# Patient Record
Sex: FEMALE | Race: WHITE
[De-identification: names, ages, dates, MRNs, and addresses within clinical notes are randomized per-mention and may not be internally consistent; named-entity substitution may affect disease eponyms.]

---

## 2019-06-03 ENCOUNTER — HOSPITAL ENCOUNTER (EMERGENCY)
Dept: HOSPITAL 47 - EC | Age: 44
Discharge: HOME | End: 2019-06-03
Payer: COMMERCIAL

## 2019-06-03 VITALS
RESPIRATION RATE: 16 BRPM | SYSTOLIC BLOOD PRESSURE: 174 MMHG | DIASTOLIC BLOOD PRESSURE: 94 MMHG | TEMPERATURE: 98.8 F | HEART RATE: 105 BPM
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
## 2019-06-03 DIAGNOSIS — S76.012A: Primary | ICD-10-CM

## 2019-06-03 DIAGNOSIS — F17.200: ICD-10-CM

## 2019-06-03 PROCEDURE — 99284 EMERGENCY DEPT VISIT MOD MDM: CPT

## 2019-06-03 PROCEDURE — 73502 X-RAY EXAM HIP UNI 2-3 VIEWS: CPT

## 2019-06-03 PROCEDURE — 72192 CT PELVIS W/O DYE: CPT

## 2019-06-03 NOTE — XR
EXAMINATION TYPE: XR Hip LT and AP Pelvis

 

DATE OF EXAM: 6/3/2019

 

COMPARISON: NONE

 

HISTORY: Pain. Possible hip dislocation on the left. Patient describes a pop.

 

TECHNIQUE: A single AP view of the pelvis is obtained. Two views of the left hip are obtained.  

 

FINDINGS:  There is no acute fracture/dislocation evident in the pelvis.  The hip and sacroiliac join
ts appear symmetric and unremarkable.  The overlying soft tissue appears unremarkable. There is a fra
cture deformity of the right inferior pubic ramus. This appears subacute as there is no extensive yanelis
vee formation.

 

Two views of left hip show no acute fracture or dislocation.  No focal lytic or sclerotic lesion seen
 in the proximal left femur. There is a curvilinear density at the medial aspect of the left femoral 
neck that could relate to atherosclerosis or heterotopic ossification. The overlying soft tissue is u
nremarkable. There is a small cam deformity of the left femoral head neck junction. 

 

IMPRESSION:

1. No evidence of left hip fracture or dislocation. Small cam deformity and moderate bilateral femora
l acetabular arthropathy.

2. Subacute appearing fracture of the right inferior pubic ramus.

## 2019-06-03 NOTE — ED
Extremity Problem HPI





- General


Chief complaint: Extremity Problem,Nontraumatic


Stated complaint: Poss Hip Dislocated


Time Seen by Provider: 06/03/19 14:38


Source: patient, RN notes reviewed


Mode of arrival: ambulatory


Limitations: physical limitation





- History of Present Illness


Initial comments: 





This a 43-year-old female presents emergency Department chief complaint left hip

pain.  Patient states that she rolled over in bed and felt a severe pop with 

sudden onset of pain.  Patient states that she can barely move her left hip 

secondary to pain.  Denies any falls no recent trauma.  Denies any prior 

surgeries or any problems her left hip in the past.  Patient has no symptoms 

that radiate into her leg denies any discoloration, anesthesias, pain in her 

lower leg.  She does have chronic back pain no she takes Tylenol Motrin for.





- Related Data


                                  Previous Rx's











 Medication  Instructions  Recorded


 


HYDROcodone/APAP 7.5-325MG [Norco 1 tab PO Q6HR PRN 3 Days #12 tab 06/03/19





7.5-325]  


 


Ibuprofen [Motrin] 600 mg PO Q8HR PRN #30 tab 06/03/19











                                    Allergies











Allergy/AdvReac Type Severity Reaction Status Date / Time


 


No Known Allergies Allergy   Verified 06/03/19 14:30














Review of Systems


ROS Statement: 


Those systems with pertinent positive or pertinent negative responses have been 

documented in the HPI.





ROS Other: All systems not noted in ROS Statement are negative.





Past Medical History


Past Medical History: No Reported History


Past Surgical History: No Surgical Hx Reported


Past Psychological History: No Psychological Hx Reported


Smoking Status: Current every day smoker


Past Alcohol Use History: Daily


Past Drug Use History: Marijuana





General Exam


Limitations: physical limitation


General appearance: alert, in no apparent distress


Respiratory exam: Present: normal lung sounds bilaterally.  Absent: respiratory 

distress, wheezes, rales, rhonchi, stridor


Cardiovascular Exam: Present: regular rate, normal rhythm, normal heart sounds. 

 Absent: systolic murmur, diastolic murmur, rubs, gallop, clicks


Extremities exam: Present: other (Left hip limited range of motion secondary 

pain there is diffuse tenderness, pedal pulses are equal bilaterally the lower 

extremities equal color equal warmth)


Skin exam: Present: warm, dry, intact, normal color.  Absent: rash





Course


                                   Vital Signs











  06/03/19





  14:30


 


Temperature 98.8 F


 


Pulse Rate 105 H


 


Respiratory 16





Rate 


 


Blood Pressure 174/94


 


O2 Sat by Pulse 100





Oximetry 














Medical Decision Making





- Medical Decision Making





43-year-old female presented for left hip pain.  X-ray was obtained that showed 

possibility of a pelvic fracture.  CT was obtained shows avulsion versus 

ligamentous versus muscular skeletal injury.  I do feel clinically that she has 

a ligamentous versus muscular tear.  She will follow-up with orthopedics she'll 

be provided pain medication anti-inflammatories.





Disposition


Clinical Impression: 


 Hip strain, Muscle tear





Disposition: HOME SELF-CARE


Condition: Stable


Instructions (If sedation given, give patient instructions):  Hip Sprain (ED)


Additional Instructions: 


Please return to the Emergency Department if symptoms worsen or any other 

concerns.


Prescriptions: 


Ibuprofen [Motrin] 600 mg PO Q8HR PRN #30 tab


 PRN Reason: Pain


HYDROcodone/APAP 7.5-325MG [Norco 7.5-325] 1 tab PO Q6HR PRN 3 Days #12 tab


 PRN Reason: Pain


Is patient prescribed a controlled substance at d/c from ED?: Yes


When asked, does pt state using other controlled substances?: No


If prescribed controlled substance>3 days was MAPS reviewed?: Prescribed <3 Days


If opioid is for acute pain is fill amount 7 days or less?: Yes


If Rx opioid, was Start Talking consent form obtained?: Yes


Referrals: 


Dany Corey DO [Primary Care Provider] - 1-2 days


Melchor Styles DO [Doctor of Osteopathic Medicine] - 1-2 days


Time of Disposition: 16:38

## 2019-06-03 NOTE — CT
EXAMINATION TYPE: CT pelvis wo con

 

DATE OF EXAM: 6/3/2019

 

COMPARISON: X-ray 6/3/2019

 

HISTORY: Left hip pain with inability to move left leg.  Patient denies injury

 

CT DLP: 185.3 mGycm

Automated exposure control for dose reduction was used.

 

FINDINGS: 

Osseous structures are intact. No acute fracture. Arthropathy of the hips with hypertrophic change of
 the acetabulum suggestive of femoral acetabular impingement.

No erosive changes.

Tiny linear density along the medial margin of the left femoral neck to small to characterize.

 

Degenerative change involving the lower lumbar spine with facet arthropathy.

 

IMPRESSION: 

THERE IS A LINEAR DENSITY ALONG THE LEFT FEMORAL NECK WHICH IS NONSPECIFIC AND TOO SMALL TO CHARACTER
IZE. TINY AVULSION INJURY IN THE DIFFERENTIAL DIAGNOSIS. ADDITIONALLY THERE IS AN AREA OF LOW ATTENUA
TION ALONG THE MUSCULATURE IN THE REGION OF THIS DENSITY. THIS COULD REPRESENT A MUSCULAR OR TENDINOU
S INJURY OR SMALL AMOUNT OF FLUID. MRI RECOMMENDED FOR FURTHER EVALUATION.

## 2024-10-21 ENCOUNTER — HOSPITAL ENCOUNTER (INPATIENT)
Dept: HOSPITAL 47 - EC | Age: 49
LOS: 4 days | Discharge: HOME | DRG: 754 | End: 2024-10-25
Attending: PSYCHIATRY & NEUROLOGY | Admitting: PSYCHIATRY & NEUROLOGY
Payer: MEDICAID

## 2024-10-21 VITALS — BODY MASS INDEX: 15.7 KG/M2

## 2024-10-21 DIAGNOSIS — S52.501A: ICD-10-CM

## 2024-10-21 DIAGNOSIS — F32.9: Primary | ICD-10-CM
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
## 2024-10-21 DIAGNOSIS — F41.1: ICD-10-CM

## 2024-10-21 DIAGNOSIS — E87.1: ICD-10-CM

## 2024-10-21 DIAGNOSIS — F17.210: ICD-10-CM

## 2024-10-21 DIAGNOSIS — F41.0: ICD-10-CM

## 2024-10-21 DIAGNOSIS — R45.851: ICD-10-CM

## 2024-10-21 DIAGNOSIS — Z55.5: ICD-10-CM

## 2024-10-21 DIAGNOSIS — F10.20: ICD-10-CM

## 2024-10-21 DIAGNOSIS — R13.10: ICD-10-CM

## 2024-10-21 DIAGNOSIS — Z11.52: ICD-10-CM

## 2024-10-21 DIAGNOSIS — Z79.899: ICD-10-CM

## 2024-10-21 DIAGNOSIS — G89.29: ICD-10-CM

## 2024-10-21 PROCEDURE — 80061 LIPID PANEL: CPT

## 2024-10-21 PROCEDURE — 99285 EMERGENCY DEPT VISIT HI MDM: CPT

## 2024-10-21 PROCEDURE — 85025 COMPLETE CBC W/AUTO DIFF WBC: CPT

## 2024-10-21 PROCEDURE — 83036 HEMOGLOBIN GLYCOSYLATED A1C: CPT

## 2024-10-21 PROCEDURE — 80053 COMPREHEN METABOLIC PANEL: CPT

## 2024-10-21 PROCEDURE — 87636 SARSCOV2 & INF A&B AMP PRB: CPT

## 2024-10-21 PROCEDURE — 82075 ASSAY OF BREATH ETHANOL: CPT

## 2024-10-21 PROCEDURE — 81003 URINALYSIS AUTO W/O SCOPE: CPT

## 2024-10-21 PROCEDURE — 80306 DRUG TEST PRSMV INSTRMNT: CPT

## 2024-10-21 PROCEDURE — 84443 ASSAY THYROID STIM HORMONE: CPT

## 2024-10-21 PROCEDURE — 80048 BASIC METABOLIC PNL TOTAL CA: CPT

## 2024-10-21 PROCEDURE — 84439 ASSAY OF FREE THYROXINE: CPT

## 2024-10-21 SDOH — EDUCATIONAL SECURITY - EDUCATION ATTAINMENT: LESS THAN A HIGH SCHOOL DIPLOMA: Z55.5

## 2024-10-21 NOTE — ED
General Adult HPI





- General


Chief complaint: Psychiatric Symptoms


Stated complaint: Fall-R wrist injury


Time Seen by Provider: 10/21/24 11:20


Source: patient, RN notes reviewed


Mode of arrival: wheelchair


Limitations: no limitations





- History of Present Illness


Initial comments: 


This is a 48-year-old female resents emergency department with multiple comp

laints.  States that she has been having pain to her right wrist over the past 

few weeks after she fell and has had residual pain since.  She has full range of

motion of the right wrist however this exacerbates pain.  She denies 

paresthesias.  Denies previous surgeries right wrist.  Additionally, states that

she has been having difficulty with swallowing over the past 5 to 6 months.  

States that she feels food gets stuck in her throat and will have episodes of 

emesis.  She is also had a amount of weight loss over this time period as well 

due to not eating.  Patient has a history of alcohol abuse and currently drinks 

approximately 12 cans of beer per day.  She also smokes about a pack of ciga

rettes per day over the past 30+ years.  Patient has lost in follow-up with 

primary and specialists over the past 2 years.








- Related Data


                                  Previous Rx's











 Medication  Instructions  Recorded


 


Folic Acid 1 mg PO DAILY 30 Days #30 tab 10/25/24


 


Melatonin 10 mg PO HS 30 Days #60 tab 10/25/24


 


Mirtazapine [Remeron] 7.5 mg PO HS 30 Days #15 tab 10/25/24


 


Multivitamins, Thera [Multivitamin 1 each PO DAILY 30 Days #30 tab 10/25/24





(formulary)]  


 


Nicotine 14Mg/24Hr Patch [Habitrol] 1 patch TRANSDERM DAILY  patch 10/25/24


 


Pantoprazole [Protonix] 40 mg PO AC-BRKFST 30 Days #30 tab 10/25/24


 


Thiamine [Vitamin B-1] 100 mg PO DAILY 30 Days #30 tab 10/25/24


 


traZODone HCL [Desyrel] 50 mg PO HS 30 Days #30 tab 10/25/24











                                    Allergies











Allergy/AdvReac Type Severity Reaction Status Date / Time


 


No Known Allergies Allergy   Verified 10/21/24 22:18














Review of Systems


ROS Statement: 


Those systems with pertinent positive or pertinent negative responses have been 

documented in the HPI.





ROS Other: All systems not noted in ROS Statement are negative.





Past Medical History


Past Medical History: No Reported History


Past Surgical History: No Surgical Hx Reported


Past Psychological History: No Psychological Hx Reported


Smoking Status: Current every day smoker


Past Alcohol Use History: Daily


Past Drug Use History: Marijuana





- Past Family History


  ** Mother


Family Medical History: Hypertension





General Exam





- General Exam Comments


Initial Comments: 


Visual Physical Exam


 


Vital signs reviewed


 


General: Well-appearing, nontoxic, no acute distress.


Head: Normocephalic, atraumatic


Eyes: PERRLA, EOMI


ENT: Airway patent


Chest: Nonlabored breathing


Skin: No visual rash, normal skin tone


Neuro: Alert and oriented 3


Musculoskeletal: No gross abnormalities








Limitations: no limitations


General appearance: alert, in no apparent distress, cachectic


Eye exam: Present: normal appearance, PERRL, EOMI.  Absent: scleral icterus, 

conjunctival injection, periorbital swelling


ENT exam: Present: normal exam, mucous membranes moist


Neck exam: Present: normal inspection.  Absent: tenderness, meningismus, 

lymphadenopathy


Respiratory exam: Present: normal lung sounds bilaterally, wheezes (bilateral). 

 Absent: respiratory distress, rales, rhonchi, stridor


Cardiovascular Exam: Present: regular rate, normal rhythm, normal heart sounds. 

 Absent: systolic murmur, diastolic murmur, rubs, gallop, clicks


GI/Abdominal exam: Present: soft, normal bowel sounds.  Absent: distended, 

tenderness, guarding, rebound, rigid


  ** Right


Hand Wrist exam: Present: normal inspection, full ROM (w/ pain), tenderness.  

Absent: swelling, abrasion, ecchymosis, deformity


Vascular: Present: normal capillary refill, radial pulse (2+).  Absent: vascular

 compromise


Back exam: Present: normal inspection


Psychiatric exam: Present: depressed, suicidal ideation





Course


                                   Vital Signs











  10/21/24





  11:17


 


Temperature 98 F


 


Pulse Rate 120 H


 


Respiratory 16





Rate 


 


Blood Pressure 130/84


 


O2 Sat by Pulse 100





Oximetry 














Procedures





- Orthopedic Splinting/Casting


  ** Injury #1


Side: right


Upper Extremity Injury Location: short arm


Upper Extremity Immobilizer: posterior splint, Ace wrap, synthetic pre-padded 

splint





Medical Decision Making





- Medical Decision Making


Was pt. sent in by a medical professional or institution (, PA, NP, urgent 

care, hospital, or nursing home...) When possible be specific


@ -No


Did you speak to anyone other than the patient for history (EMS, parent, family,

 police, friend...)? What history was obtained from this source 


@ -No


Did you review nursing and triage notes (agree or disagree)? Why? 


@ -I reviewed and agree with nursing and triage notes


Were old charts reviewed (outside hosp., previous admission, EMS record, old 

EKG, old radiological studies, urgent care reports/EKG's, nursing home records)?

 Report findings 


@ -No old charts were reviewed


Differential Diagnosis (chest pain, altered mental status, abdominal pain women,

 abdominal pain men, vaginal bleeding, weakness, fever, dyspnea, syncope, 

headache, dizziness, GI bleed, back pain, seizure, CVA, palpatations, mental 

health, musculoskeletal)? 


@ -Differential Mental Health


Depression, anxiety, bipolar, psychosis, schizophrenia, borderline personality, 

situational depression, adjustment disorder, behavioral disorder, brain tumor, 

malingering, substance abuse, encephalopathy, medication reaction, dementia, 

hypothyroidism, degenerative neurologic disorder, lupus.... This is not meant to

 be all-inclusive list


EKG interpreted by me (3pts min.).


@ -None


X-rays interpreted by me (1pt min.).


@ -X-ray of the right wrist reveals a nondisplaced minimally impacted transverse

 distal radial metaphyseal fracture extending into the distal radial ulnar joint


CT interpreted by me (1pt min.).


@ -None done


U/S interpreted by me (1pt. min.).


@ -None done


What testing was considered but not performed or refused? (CT, X-rays, U/S, 

labs)? Why?


@ -None


What meds were considered but not given or refused? Why?


@ -None


Did you discuss the management of the patient with other professionals 

(professionals i.e. , PA, NP, lab, RT, psych nurse, , , 

teacher, , )? Give summary


@ -No


Was smoking cessation discussed for >3mins.?


@ -No


Was critical care preformed (if so, how long)?


@ -No


Were there social determinants of health that impacted care today? How? 

(Homelessness, low income, unemployed, alcoholism, drug addiction, 

transportation, low edu. Level, literacy, decrease access to med. care, group home, 

rehab)?


@ -No


Was there de-escalation of care discussed even if they declined (Discuss DNR or 

withdrawal of care, Hospice)? DNR status


@ -No


What co-morbidities impacted this encounter? (DM, HTN, Smoking, COPD, CAD, 

Cancer, CVA, ARF, Chemo, Hep., AIDS, mental health diagnosis, sleep apnea, 

morbid obesity)?


@ -None


Was patient admitted / discharged? Hospital course, mention meds given and 

route, prescriptions, significant lab abnormalities, going to OR and other 

pertinent info.


@ -admitted. 48-year-old female with wrist pain and difficulty swallowing.  On 

patient's original triage it was noted that patient has been having suicidal 

ideation.  On my discussion with the patient she states that she has been in 

chronic pain over the past few years and would like for everything to end.  

States that if she were to commit suicide she would most likely go into her 

garage and die of carbon monoxide poisoning.  Additionally patient does have 

pain with range of motion of the right wrist and pain to palpation of the wrist.

  X-rays remarkable for a nondisplaced minimally impacted transverse distal 

fracture.  Patient was placed in a splint.  Discussed with patient at bedside 

recommend laboratory evaluation for difficulty swallowing and imaging however 

she has refused at this time.  Patient is adamant that she would like to leave. 

 Evaluation by psychiatric nurse was completed where was recommended that patie

nt be admitted to psychiatric facility due to suicidal ideation with plan and 

intent.  Additionally patient at bedside she is still refusing care and would 

like to leave AGAINST MEDICAL ADVICE however patient has been petitioned by 

psychiatry and will be admitted for further evaluation.


Undiagnosed new problem with uncertain prognosis?


@ -No


Drug Therapy requiring intensive monitoring for toxicity (Heparin, Nitro, 

Insulin, Cardizem)?


@ -No


Were any procedures done?


@ -ortho splinting


Diagnosis/symptom?


@ -suicidal ideation, radial fracture


Acute, or Chronic, or Acute on Chronic?


@ -acute


Uncomplicated (without systemic symptoms) or Complicated (systemic symptoms)?


@ -complicated


Side effects of treatment?


@ -No


Exacerbation, Progression, or Severe Exacerbation?


@ -No


Poses a threat to life or bodily function? How? (Chest pain, USA, MI, pneumonia,

 PE, COPD, DKA, ARF, appy, cholecystitis, CVA, Diverticulitis, Homicidal, 

Suicidal, threat to staff... and all critical care pts)


@ -yes





- Lab Data


Result diagrams: 


                                 10/22/24 08:38





                                 10/24/24 12:48


                                   Lab Results











  10/21/24 Range/Units





  19:14 


 


Influenza Type A (PCR)  Not Detected  (Not Detectd)  


 


Influenza Type B (PCR)  Not Detected  (Not Detectd)  


 


RSV (PCR)  Not Detected  (Not Detectd)  


 


SARS-CoV-2 (PCR)  Not Detected  (Not Detectd)  














Disposition


Clinical Impression: 


 Radial fracture, Depression, Suicidal ideation





Disposition: ADMITTED AS IP TO THIS HOSP


Condition: Serious

## 2024-10-21 NOTE — XR
EXAMINATION TYPE: XR wrist complete RT

 

DATE OF EXAM: 10/21/2024

 

COMPARISON: NONE

 

HISTORY: 48-year-old female pain after fall, history of previous injury

 

TECHNIQUE: 4 views

 

FINDINGS: There appears to be a nondisplaced but minimally impacted transverse distal radial metaphys
eal fracture. Fracture line probably extending into the distal radial ulnar joint. Associated soft ti
ssue swelling. Mild change first CMC joint.

 

IMPRESSION: Nondisplaced but minimally impacted transverse distal radial metaphyseal fracture. Fractu
re line probably extends into the distal radioulnar joint. Associated soft tissue swelling.

 

[facility], Workstation: [id number], 10/21/2024 12:03 PM

## 2024-10-22 VITALS — RESPIRATION RATE: 16 BRPM

## 2024-10-22 LAB
ALBUMIN SERPL-MCNC: 3.3 G/DL (ref 3.5–5)
ALP SERPL-CCNC: 198 U/L (ref 38–126)
ALT SERPL-CCNC: 40 U/L (ref 4–34)
ANION GAP SERPL CALC-SCNC: 10 MMOL/L
AST SERPL-CCNC: 87 U/L (ref 14–36)
BASOPHILS # BLD AUTO: 0.1 K/UL (ref 0–0.2)
BASOPHILS NFR BLD AUTO: 1 %
BUN SERPL-SCNC: 2 MG/DL (ref 7–17)
CALCIUM SPEC-MCNC: 9.2 MG/DL (ref 8.4–10.2)
CHLORIDE SERPL-SCNC: 98 MMOL/L (ref 98–107)
CHOLEST SERPL-MCNC: 184 MG/DL (ref 0–200)
CO2 SERPL-SCNC: 23 MMOL/L (ref 22–30)
EOSINOPHIL # BLD AUTO: 0.1 K/UL (ref 0–0.7)
EOSINOPHIL NFR BLD AUTO: 1 %
ERYTHROCYTE [DISTWIDTH] IN BLOOD BY AUTOMATED COUNT: 3.98 M/UL (ref 3.8–5.4)
ERYTHROCYTE [DISTWIDTH] IN BLOOD: 14.5 % (ref 11.5–15.5)
GLUCOSE SERPL-MCNC: 130 MG/DL (ref 74–99)
HCT VFR BLD AUTO: 41.8 % (ref 34–46)
HDLC SERPL-MCNC: 47.9 MG/DL (ref 40–60)
HGB BLD-MCNC: 13.5 GM/DL (ref 11.4–16)
LDLC SERPL CALC-MCNC: 114.7 MG/DL (ref 0–131)
LYMPHOCYTES # SPEC AUTO: 3.9 K/UL (ref 1–4.8)
LYMPHOCYTES NFR SPEC AUTO: 34 %
MCH RBC QN AUTO: 33.9 PG (ref 25–35)
MCHC RBC AUTO-ENTMCNC: 32.2 G/DL (ref 31–37)
MCV RBC AUTO: 105 FL (ref 80–100)
MONOCYTES # BLD AUTO: 0.9 K/UL (ref 0–1)
MONOCYTES NFR BLD AUTO: 8 %
NEUTROPHILS # BLD AUTO: 6.4 K/UL (ref 1.3–7.7)
NEUTROPHILS NFR BLD AUTO: 55 %
PLATELET # BLD AUTO: 372 K/UL (ref 150–450)
POTASSIUM SERPL-SCNC: 4 MMOL/L (ref 3.5–5.1)
PROT SERPL-MCNC: 7.1 G/DL (ref 6.3–8.2)
SODIUM SERPL-SCNC: 131 MMOL/L (ref 137–145)
TRIGL SERPL-MCNC: 107 MG/DL (ref 0–149)
VLDLC SERPL CALC-MCNC: 21.4 MG/DL (ref 5–40)
WBC # BLD AUTO: 11.7 K/UL (ref 3.8–10.6)

## 2024-10-22 RX ADMIN — MIRTAZAPINE SCH MG: 15 TABLET, FILM COATED ORAL at 20:59

## 2024-10-22 RX ADMIN — Medication SCH MG: at 08:53

## 2024-10-22 RX ADMIN — FOLIC ACID SCH MG: 1 TABLET ORAL at 08:53

## 2024-10-22 RX ADMIN — NICOTINE SCH PATCH: 14 PATCH, EXTENDED RELEASE TRANSDERMAL at 08:53

## 2024-10-22 RX ADMIN — THERA TABS SCH EACH: TAB at 08:53

## 2024-10-22 RX ADMIN — Medication SCH MG: at 21:00

## 2024-10-22 NOTE — P.CNOR
History of Present Illness





- [facility]


Consult date: 10/22/24


Requesting physician: Oniel Pina


Consult reason: other (Fractured wrist)


History of present illness: 








Patient is a 47 y/o female who presented to the emergency department yesterday 

due to right wrist pain over the past couple weeks.  Orthopedics was consulted 

due to right wrist distal radius fracture.  Patient was seen at bedside this 

morning on the behavioral health unit with splint and Ace wrap present to the 

right upper extremity.  Patient states about 2 to 3 weeks ago she tripped and 

fell onto an outstretched hand.  She says over the past few weeks she has been 

managing her pain with over-the-counter pain medication.  Patient states the 

pain has been tolerable but it has not gotten any better.  She says the right 

hand does feel weak.  Patient denies any previous orthopedic surgical history.  

Patient denies being on any blood thinners.  Patient denies chest pain, fever, 

shortness of breath, nausea, vomiting, change in vision, loss of bowel/bladder 

control.





Past Medical History


Past Medical History: No Reported History


Additional Past Medical History / Comment(s): Right radial fx whereby pt states 

she "fell about 3 weeks ago." from 10/21/2024


History of Any Multi-Drug Resistant Organisms: None Reported


Past Surgical History: No Surgical Hx Reported


Past Anesthesia/Blood Transfusion Reactions: No Reported Reaction


Past Psychological History: No Psychological Hx Reported


Smoking Status: Current every day smoker


Past Alcohol Use History: Daily, Heavy


Additional Past Alcohol Use History / Comment(s): Pt states she drinks at least 

12 beers daily x several years. Pt states "I start drinking in the morning and 

drink all day."


Past Drug Use History: Marijuana


Additional Drug Use History / Comment(s): UDS not obtained 10/21/2024





Medications and Allergies


                                Home Medications











 Medication  Instructions  Recorded  Confirmed  Type


 


No Known Home Medications  10/21/24 10/21/24 History








                                    Allergies











Allergy/AdvReac Type Severity Reaction Status Date / Time


 


No Known Allergies Allergy   Verified 10/21/24 22:18














Physical Examination


Inspection: Negative for any open fractures, significant erythema/wounds.  Ace 

bandage and splint present to the right upper extremity over the forearm. 





Sensation: Equal, symmetric, bilat intact throughout the upper extremities





Palpation: Moderate tenderness palpation over the right distal radius. NTTP 

throughout rest of exam





Range of motion: Full range of motion throughout bilateral elbows in flexion 

extension.  Patient does have good range of motion in the shoulders and forward 

elevation, abduction and external/internal rotation.  Patient does have limited 

range of motion of the right wrist in flexion's extension secondary to injury to

the wrist.  Patient is able to wiggle digits and upper extremities bilaterally.





Motor: 4/5 in all major motor groups in left upper extremity. 4/5 in resisted 

right shoulder abduction, forward elevation, internal/external rotation. 4-/5 in

resisted right elbow flexion to extension.  Right wrist motor exam not performed

due to injury





Neurovascular: radial pulse intact, 2+ bilaterally.  Cap refill under 3 seconds 

in digits of upper extremities.





Special test: Negative Homans bilaterally.








Results





- Labs


Labs: 


                  Abnormal Lab Results - Last 24 Hours (Table)











  10/22/24 10/22/24 Range/Units





  08:38 08:38 


 


WBC  11.7 H   (3.8-10.6)  k/uL


 


MCV  105.0 H   (80.0-100.0)  fL


 


Sodium   131 L  (137-145)  mmol/L


 


BUN   2 L  (7-17)  mg/dL


 


Creatinine   0.38 L  (0.52-1.04)  mg/dL


 


Glucose   130 H  (74-99)  mg/dL


 


AST   87 H  (14-36)  U/L


 


ALT   40 H  (4-34)  U/L


 


Alkaline Phosphatase   198 H  ()  U/L


 


Albumin   3.3 L  (3.5-5.0)  g/dL


 


TSH   13.400 H  (0.465-4.680)  mIU/L








                                      H & H











  10/22/24 Range/Units





  08:38 


 


Hgb  13.5  (11.4-16.0)  gm/dL


 


Hct  41.8  (34.0-46.0)  %











Result Diagrams: 


                                 10/22/24 08:38





                                 10/22/24 08:38





- Diagnostic results


Wrist/Hand x-ray: report reviewed, image reviewed (Right wrist x-ray does 

demonstrate right distal radius fracture nondisplaced, impacted)





Assessment and Plan


Assessment: 


1.  Right distal radius fracture





Plan: 


1.  Right distal radius fracture - right wrist x-ray does demonstrate right 

distal radius fracture nondisplaced, impacted.  I did review the findings of the

imaging and exam with my attending, Dr. Melton.  At this time we are not 

recommending any emergent orthopedic surgical intervention.  I did change the 

splint at bedside this morning.  Patient be nonweightbearing to the right upper 

extremity.  Pain medication as needed.  Patient to follow-up with Dr. Melton in 

the outpatient setting in 1 week.  Patient is stable from Orthopedic standpoint 

for discharge.  At this time orthopedics is signing off.  Please do not hesitate

to contact us for any further questions.





2.  Appreciate medical management





3.  Pain management -Tylenol; Motrin





4.  GI prophylaxis -MiraLAX; milk of mag





5.  DVT prophylaxis recs





6.  PT/OT -nonweightbearing right upper extremity





7.  Appreciate consult





Time with Patient: Less than 30

## 2024-10-22 NOTE — P.PN
Progress Note - Text


Progress Note Date: 10/22/24





Attempted to see the patient in the [facility] on 10/22 at 2100. The patient refused to

be seen or be evaluated.

## 2024-10-22 NOTE — P.HP
Psychiatric H&P





- .


H&P Date: 10/22/24


History & Physical: 


                                    Allergies











Allergy/AdvReac Type Severity Reaction Status Date / Time


 


No Known Allergies Allergy   Verified 10/21/24 22:18








                                   Vital Signs











Temp  98.1 F   10/22/24 06:48


 


Pulse  137 H  10/22/24 06:48


 


Resp  16   10/22/24 06:48


 


BP  123/85   10/22/24 06:48


 


Pulse Ox  97   10/22/24 06:48


 


FiO2      








                                 Intake & Output











 10/21/24 10/22/24 10/22/24





 18:59 06:59 18:59


 


Weight 37.648 kg 32.914 kg 








                             Laboratory Last Values











WBC  11.7 k/uL (3.8-10.6)  H  10/22/24  08:38    


 


RBC  3.98 m/uL (3.80-5.40)   10/22/24  08:38    


 


Hgb  13.5 gm/dL (11.4-16.0)   10/22/24  08:38    


 


Hct  41.8 % (34.0-46.0)   10/22/24  08:38    


 


MCV  105.0 fL (80.0-100.0)  H  10/22/24  08:38    


 


MCH  33.9 pg (25.0-35.0)   10/22/24  08:38    


 


MCHC  32.2 g/dL (31.0-37.0)   10/22/24  08:38    


 


RDW  14.5 % (11.5-15.5)   10/22/24  08:38    


 


Plt Count  372 k/uL (150-450)   10/22/24  08:38    


 


MPV  7.9   10/22/24  08:38    


 


Neutrophils %  55 %  10/22/24  08:38    


 


Lymphocytes %  34 %  10/22/24  08:38    


 


Monocytes %  8 %  10/22/24  08:38    


 


Eosinophils %  1 %  10/22/24  08:38    


 


Basophils %  1 %  10/22/24  08:38    


 


Neutrophils #  6.4 k/uL (1.3-7.7)   10/22/24  08:38    


 


Lymphocytes #  3.9 k/uL (1.0-4.8)   10/22/24  08:38    


 


Monocytes #  0.9 k/uL (0-1.0)   10/22/24  08:38    


 


Eosinophils #  0.1 k/uL (0-0.7)   10/22/24  08:38    


 


Basophils #  0.1 k/uL (0-0.2)   10/22/24  08:38    


 


Macrocytosis  Moderate   10/22/24  08:38    


 


Sodium  131 mmol/L (137-145)  L  10/22/24  08:38    


 


Potassium  4.0 mmol/L (3.5-5.1)   10/22/24  08:38    


 


Chloride  98 mmol/L ()   10/22/24  08:38    


 


Carbon Dioxide  23 mmol/L (22-30)   10/22/24  08:38    


 


Anion Gap  10 mmol/L  10/22/24  08:38    


 


BUN  2 mg/dL (7-17)  L  10/22/24  08:38    


 


Creatinine  0.38 mg/dL (0.52-1.04)  L  10/22/24  08:38    


 


Est GFR (CKD-EPI)AfAm  >90  (>60 ml/min/1.73 sqM)   10/22/24  08:38    


 


Est GFR (CKD-EPI)NonAf  >90  (>60 ml/min/1.73 sqM)   10/22/24  08:38    


 


Glucose  130 mg/dL (74-99)  H  10/22/24  08:38    


 


Calcium  9.2 mg/dL (8.4-10.2)   10/22/24  08:38    


 


Total Bilirubin  1.2 mg/dL (0.2-1.3)   10/22/24  08:38    


 


AST  87 U/L (14-36)  H  10/22/24  08:38    


 


ALT  40 U/L (4-34)  H  10/22/24  08:38    


 


Alkaline Phosphatase  198 U/L ()  H  10/22/24  08:38    


 


Total Protein  7.1 g/dL (6.3-8.2)   10/22/24  08:38    


 


Albumin  3.3 g/dL (3.5-5.0)  L  10/22/24  08:38    


 


TSH  13.400 mIU/L (0.465-4.680)  H  10/22/24  08:38    


 


Influenza Type A (PCR)  Not Detected  (Not Detectd)   10/21/24  19:14    


 


Influenza Type B (PCR)  Not Detected  (Not Detectd)   10/21/24  19:14    


 


RSV (PCR)  Not Detected  (Not Detectd)   10/21/24  19:14    


 


SARS-CoV-2 (PCR)  Not Detected  (Not Detectd)   10/21/24  19:14    











10/22/24 11:43


IDENTIFYING DATA: Patient is a 48-year-old  female, unemployed, currently

staying in [place]





CHIEF COMPLAINT: "Pain"





HPI: Patient presented to the hospital on 10/21 with a right wrist injury.  Per 

ED note, "states that she has been having pain to her right wrist over the past 

few weeks after she fell and has had residual pain since.  She has full range of

motion of the right wrist.  States that she has been having difficulty with 

swallowing over the past 5 to 6 months.  States that she feels food gets stuck 

in her throat will have episodes of emesis.  She has had an amount of weight 

loss over that time period as well due to not eating.  Patient has a history of 

alcohol abuse and currently drinks approximately 12 cans of beer per day she has

lost in follow-up with primary and specialist over the past 2 years."  EPS 

assessment revealed, "patient wanted to leave AMA.  Patient disclosed to triage 

that they have suicidal thoughts.  Upon further exploration of the statements, 

patient reports passive suicidal ideation.  When asked about a specific plan, 

patient states there are several ways to do it but she has only thought about 

carbon monoxide.  Patient reports physical health issues over the last 4 to 6 

months, chronic pain, not sleeping but 20 to 30 minutes a day sometimes, trouble

swallowing, significant weight loss of 26 pounds during the 4 to 6-month 

period."  Patient seen and evaluated on the unit and was agreeable to speak to 

writer in office.  She states she has been having issues with pain, specifically

her hip and wrist and states that she fell roughly 3 weeks ago.  She reports 

difficulty swallowing which resulted in a significant amount of weight loss.  

She states being unable to sleep because of her severe pain however does not 

report feeling fatigued throughout the day.  She states she has been drinking 

roughly 12-12 ounce beers per day however she does not feel like she has a 

problem drinking and has never gone to rehab and is not interested at the 

moment.  She feels like this is her only way of consuming calories as she is 

unable to swallow food.  She reports low energy as well as 2 incidents of 

suicidal ideations that appear to be more passive in nature as she denied any p

perla or intent.  At this time, patient denies any suicidal or homicidal ideations

intent or plan.  At this time patient denies any auditory or visual 

hallucinations.  Patient denies any flight of ideas racing thoughts and 

increased in goal directed behavior.  Patient admits to using tobacco.





PAST PSYCHIATRIC HISTORY: Patient has a history of alcohol use disorder.  

Patient denies being on any psychiatric medications.  Patient denies any 

previous psychiatric hospitalizations.  Patient denies any psychiatric 

outpatient follow-up.  Patient denies any history of suicide attempts in the 

past.





PMH: as per ER note





ALLERGIES: as per EMR





SUBSTANCE USE HISTORY: Patient reports smoking 1 pack/day of cigarettes and 12 

pack of beer per day over the course of several years





FAMILY PSYCHIATRIC/SUBSTANCE USE HISTORY: She reports her daughter has 

depression





SOCIAL HISTORY: Patient is  and has 2 kids that are grown.  She currently

lives in [place] and is unemployed.  Highest level of education is 11th grade.  

She denied any legal issues





MENTAL STATUS EXAM: 


General Appearance: Patient appears to be older than stated age is alert, 

directable, and attempts to cooperate. Patient appears to have poor hygiene and 

grooming.


Behavior: Patient is seated without any agitated behavior.


Speech: Patient's speech is fluent and nonpressured.


Mood/Affect: Patient reports their mood is "upset", affect is congruent and 

constricted. 


Suicidality/Homicidality:  Patient denies having any homicidal ideation intent 

or plan.  Denies any suicidal ideations intent or plan


Perceptions: Patient denies any visual hallucinations and denies any auditory 

hallucinations


Though content/process: There is no evidence of any delusional thought content 

and thought process is linear and goal-directed.


Memory and concentration: AOX3, grossly intact for the purposes of this session.

Can spell "WORLD" backwards


Judgment and insight: Poor





STRENGTHS/WEAKNESSES: strength is that patient is resilient. Weakness is that 

patient has poor judgment, heavy alcohol use and is impulsive





INTELLECT: Average





IMPRESSIONS: 


Major depressive disorder


Alcohol use disorder, severe


Generalized anxiety disorder with panic attacks


Nicotine dependence





PLAN: 


-Patient is admitted under involuntary status to [facility] for stabilization of 

psychiatric symptoms and safety. Patient has not signed adult voluntary form and

medication consent and is placed in patient's chart.  A second certification was

completed and along with petition will be filed for court.


-Medications : Start mirtazapine 7.5 mg at bedtime for mood/sleep/appetite, 

melatonin 5 mg at bedtime for sleep


-Ativan and Haldol PRN for agitation/aggression


-Started thiamine, MVM for etoh use


-CIWA protocol with Ativan PRN for ETOH withdrawal.


-Patient was counselled on substance abuse and desired to cut back on use-Will 

offer patient subtance use rehab however she declined at this moment


-Patient was informed of the risks, benefits and side effects of the medication 

and patient verbally consented to taking the medications. Patient signed med 

consent form and was placed in chart.


-Internal Medicine consult to perform medical evaluation and physical.


-NRT -nicotine patch


-SW on board for discharge planning. Encourage patient to participate in groups 

to work on coping skills.  Will await deferral and court date.

## 2024-10-23 VITALS — TEMPERATURE: 97.9 F

## 2024-10-23 LAB
ALBUMIN SERPL-MCNC: 3 G/DL (ref 3.5–5)
ALP SERPL-CCNC: 177 U/L (ref 38–126)
ALT SERPL-CCNC: 27 U/L (ref 4–34)
ANION GAP SERPL CALC-SCNC: 3 MMOL/L
AST SERPL-CCNC: 65 U/L (ref 14–36)
BUN SERPL-SCNC: 3 MG/DL (ref 7–17)
CALCIUM SPEC-MCNC: 8.9 MG/DL (ref 8.4–10.2)
CHLORIDE SERPL-SCNC: 98 MMOL/L (ref 98–107)
CO2 SERPL-SCNC: 27 MMOL/L (ref 22–30)
GLUCOSE SERPL-MCNC: 71 MG/DL (ref 74–99)
PH UR: 6 [PH] (ref 5–8)
POTASSIUM SERPL-SCNC: 3.8 MMOL/L (ref 3.5–5.1)
PROT SERPL-MCNC: 6.5 G/DL (ref 6.3–8.2)
SODIUM SERPL-SCNC: 128 MMOL/L (ref 137–145)
SP GR UR: 1 (ref 1–1.03)
T4 FREE SERPL-MCNC: 1.14 NG/DL (ref 0.78–2.19)
UROBILINOGEN UR QL STRIP: <2 MG/DL (ref ?–2)

## 2024-10-23 RX ADMIN — Medication SCH MG: at 21:06

## 2024-10-23 NOTE — P.PN
Progress Note - Text


Progress Note Date: 10/23/24





Interval History:


Patient was seen in groups and was directable and agreeable to speak with writer

in the office.  She reports poor sleep overnight despite staff reporting 7 hours

of sleep.  She denied any pain interference with her sleep but states that she 

was up the entire night.  Despite the lack of sleep, patient denied any fatigue 

or adverse effects to the medications.  She states she has never done a sleep 

study and this was recommended to her for outpatient follow-up.  Orthopedics al

so recommended patient to follow-up in 1 week given her wrist fracture.  She 

continues to report poor appetite and dietitian is recommending pured texture 

diet.  Patient denied any alcohol cravings and still does not wish to accept any

help with her alcohol use.  She reported low anxiety today.  At this time 

patient denies any suicidal or homicidal ideations, intent or plan. Patient 

denies any auditory, visual hallucinations and denies any paranoia or delusions.

Patient denies any side effects from the medications and has been compliant with

meds. 





Mental Status Exam:


General Appearance: Patient appears to be older than stated age is alert, 

directable, and cooperative.


Behavior: Patient is calmly seated without any agitated behavior.  Patient 

ambulates via walker


Speech: Patient's speech is fluent and nonpressured. 


Mood/Affect: Mood is improving mildly, affect is congruent and constricted. 


Suicidality/Homicidality:  Patient denies having any suicidal or homicidal 

ideation intent or plan.  


Perceptions: Patient denies any visual hallucinations and denies any auditory 

hallucinations


Though content/process: There is no evidence of any delusional thought content 

and thought process is linear and goal-directed.


Memory and concentration: AOX3, grossly intact for the purposes of this session


Judgment and insight: Improving mildly





Assessment


Major depressive disorder


Alcohol use disorder, severe


Generalized anxiety disorder with panic attacks


Nicotine dependence





Plan:


-Patient continues to meet criteria for inpatient psychiatric admission for 

symptom stabilization and safety. Patient has not signed adult voluntary form 

and medication consent and was placed in patient's chart.


-Medications: Continue Remeron 7.5 mg at bedtime for mood/sleep/appetite, 

increase melatonin to 10 mg at bedtime for sleep, start trazodone 50 mg at 

bedtime for sleep


-When necessary Ativan and Haldol for agitation/aggression.


-Labs: Reviewed, TSH elevated, fT4 ordered


-NRT -nicotine patch


-Orthopedics recommending patient to follow-up in 1 week


-SW on board for discharge planning.  Encouraged the patient to participate in 

milieu.  Currently awaiting deferral with  and court date.

## 2024-10-24 LAB
ANION GAP SERPL CALC-SCNC: 2 MMOL/L
BUN SERPL-SCNC: 4 MG/DL (ref 7–17)
CALCIUM SPEC-MCNC: 8.7 MG/DL (ref 8.4–10.2)
CHLORIDE SERPL-SCNC: 98 MMOL/L (ref 98–107)
CO2 SERPL-SCNC: 30 MMOL/L (ref 22–30)
GLUCOSE SERPL-MCNC: 86 MG/DL (ref 74–99)
POTASSIUM SERPL-SCNC: 3.9 MMOL/L (ref 3.5–5.1)
SODIUM SERPL-SCNC: 130 MMOL/L (ref 137–145)

## 2024-10-24 RX ADMIN — Medication STA MG: at 09:53

## 2024-10-24 NOTE — P.CONS
History of Present Illness





- Reason for Consult


Consult date: 10/24/24


Dysphagia, weight loss


Requesting physician: Evelin Gray





- Chief Complaint


Wrist pain





- History of Present Illness





This is a 48-year-old female who had presented to the emergency department 3 

days ago with multiple complaints including wrist pain, complaints of food 

getting stuck in her throat and difficulty with swallowing for the last 5 to 6 

months, as well as unintentional weight loss.  She has history of alcohol abuse 

and drinks about 12 beers a day has a history of depression and anxiety.  She 

was admitted on voluntarily to the mental health unit for further evaluation and

treatment.  Gastroenterology was consulted for dysphagia and weight loss.  Again

patient states that she has difficulty with swallowing and feels that food gets 

stuck in her throat and then it will be painful in her chest with swallowing.  

She also states that she has had about 22 pound weight loss over the last few 

weeks duration.  She denies any emesis no previous history of upper endoscopy.  

Denies history of peptic ulcer disease.  Denies regular NSAID use.





Review of Systems





REVIEW OF SYSTEMS:


CARDIOPULMONARY: No chest pain or shortness of breath.  


Gastrointestinal: No abdominal pain.   No nausea or vomiting.  No hematemesis, 

coffee-ground emesis.  No rectal bleeding, or melena.  Difficulty with 

swallowing, weight loss.


GENITOURINARY:  No dysuria or hematuria. 


MUSCULOSKELETAL: Reports normal range of motion.,  Joint pain.


SKIN: No rashes.  No jaundice.


ENDOCRINE: No chills, fevers.  No excessive weight gain or loss.  No polydipsia 

or polyuria.


PSYCHIATRIC: Unremarkable. 


NEUROLOGY: No change in mental status.  Denies dizziness, headache.


ENT: Vision unremarkable. 


CONSTITUTIONAL: Unintentional weight loss.  No fever, chills, night sweats.





Past Medical History


Past Medical History: No Reported History


Additional Past Medical History / Comment(s): Right radial fx whereby pt states 

she "fell about 3 weeks ago." from 10/21/2024


History of Any Multi-Drug Resistant Organisms: None Reported


Past Surgical History: No Surgical Hx Reported


Past Anesthesia/Blood Transfusion Reactions: No Reported Reaction


Past Psychological History: No Psychological Hx Reported


Smoking Status: Current every day smoker


Past Alcohol Use History: Daily, Heavy


Additional Past Alcohol Use History / Comment(s): Pt states she drinks at least 

12 beers daily x several years. Pt states "I start drinking in the morning and 

drink all day."


Past Drug Use History: Marijuana


Additional Drug Use History / Comment(s): UDS not obtained 10/21/2024





- Past Family History


  ** Mother


Family Medical History: Hypertension





Medications and Allergies


                                Home Medications











 Medication  Instructions  Recorded  Confirmed  Type


 


No Known Home Medications  10/21/24 10/21/24 History








                                    Allergies











Allergy/AdvReac Type Severity Reaction Status Date / Time


 


No Known Allergies Allergy   Verified 10/21/24 22:18














Physical Exam


Vitals: 


                                   Vital Signs











  Pulse BP


 


 10/24/24 06:58  107 H  97/66














General appearance: The patient is alert, oriented, appears in no acute 

distress.


HET: Head is normocephalic and atraumatic.  Conjunctiva pink.  Sclera anicteric.


Neck: Supple without lymphadenopathy.  Trachea midline.


Heart: Regular.


Lungs: Equal expansion, normal respiratory effort.


Abdomen: Soft, nontender, nondistended.


Skin:  No rashes. No jaundice.


Extremities: Normal skin color and turgor.  No pedal edema.


Neurological: No focal deficits.  Alert and oriented x3.





Results


CBC & Chem 7: 


                                 10/22/24 08:38





                                 10/24/24 12:48


Labs: 


                  Abnormal Lab Results - Last 24 Hours (Table)











  10/23/24 10/24/24 Range/Units





  15:17 12:48 


 


Sodium   130 L  (137-145)  mmol/L


 


BUN   4 L  (7-17)  mg/dL


 


Creatinine   0.42 L  (0.52-1.04)  mg/dL


 


U Benzodiazepines Scrn  Detected H   (NotDetected)  














Assessment and Plan


(1) Dysphagia


Narrative/Plan: 


48-year-old female with alcohol use disorder with complaints of dysphagia for 

the last 5 to 6 months.  Unclear etiology could be secondary to gastritis, 

peptic ulcer disease or other etiology.  Need to consider possible esophageal 

stricture.  Patient offered upper endoscopy however is declining as she states 

that she will be discharged tomorrow and would like to have it done as an 

outpatient.  This is reasonable and we will try to treat symptomatically with Pr

otonix 40 mg daily.  Diet as tolerated.


Current Visit: Yes   Status: Acute   Code(s): R13.10 - DYSPHAGIA, UNSPECIFIED   

SNOMED Code(s): 34292101


   





(2) Unintentional weight loss


Current Visit: Yes   Status: Acute   Code(s): R63.4 - ABNORMAL WEIGHT LOSS   

SNOMED Code(s): 834873562


   


Plan: 





1.  Continue symptomatic and supportive care


2.  Protonix 40 mg daily


3.  Diet as tolerated


4.  Patient offered upper endoscopy tomorrow however patient is declining and 

would like to have it done as an outpatient


Thank you for this consultation, patient is cleared from gastroenterology for 

discharge. 





Dr. ANGELLA Mooney


I agree with the dictator's note, documented as a scribe by Reema THOMPSON.

## 2024-10-24 NOTE — P.CONS
History of Present Illness





- Reason for Consult


Consult date: 10/23/24





- History of Present Illness





The patient is a 48-year-old female with no known PMH who had presented to the 

emergency room with complaints.  Patient reported that she had a fall several 

weeks ago and injured her right wrist and has been experiencing pain there with 

activities.  She also reports dysphagia with solids and occasionally with 

liquids for the past 5 to 6 months.  Reports that as a result she has been 

unable to eat as much as usual and has lost a significant amount of weight after

40 pounds in the past 6 months.  Denies a dyne aphasia but does report some 

epigastric abdominal discomfort with eating.  She did report ongoing alcohol 

abuse drinking 12 cans of beer daily.  Also reports feeling generalized weakness

due to her poor appetite and diet.  Denies experiencing chest discomfort, 

nausea, vomiting, diarrhea, fever, chills, cough.  Denies any illicit substance 

use. The patient was admitted to the mental health unit for depression and 

generalized anxiety disorder with panic attacks.





Review of systems:


Pertinent positives and negatives as discussed in HPI, a complete review of 

systems was performed and all other systems are negative.





Physical examination:


General: non toxic, no distress, appears at stated age, normal weight


Derm: no unusual rashes/lesions, no unusual ecchymoses, warm, dry


Head: atraumatic, normocephalic, symmetric


Eyes: EOMI, no lid lag, anicteric sclera


ENT: Nose and ears atraumatic, no thrush,  no pharyngeal erythema


Neck: trachea midline, supple


Mouth: no lip lesion, mucus membranes moist


Cardiovascular: S1S2 reg, no murmur, no edema


Lungs: CTA bilateral, no rhonchi, no rales , no accessory muscle use


Abdominal: soft,  nontender to palpation, no guarding


Ext: no gross muscle atrophy, no contractures, 


Neuro: No gross focal neuro deficits noted 


Psych: Alert, oriented, appropriate affect 





Assessment:





Right distal radius fracture


Dysphagia with significant weight loss with solids and liquids


Hyponatremia


Hypoglycemia


Depression with anxiety and panic attacks


Leukocytosis, no signs of active infection at this time, likely reactive





Imaging:


Wrist x-ray on the right side revealed a nondisplaced minimally impacted distal 

radial fracture.





Data Review:


Laboratory evaluation was remarkable for sodium 128, glucose 71, AST 65, alk 

phos 177, WBC count 11.7, with .





Plan:





Orthopedic surgery recommendations appreciated.  No aggressive intervention at t

his time


Consult GI in light of dysphagia and epigastric discomfort


Monitor BMP for resolution of hyponatremia and hypoglycemia


Encourage oral intake


Defer management of depression and suicidal ideation to the primary psychiatry 

service





Thank you for allowing us to participate in the care of this patient.  We will 

follow peripherally.  Do not hesitate to contact us with questions.  Someone can

be reached from the [facility] hospitalist group at all hours of the day 

at [phone].





Past Medical History


Past Medical History: No Reported History


Additional Past Medical History / Comment(s): Right radial fx whereby pt states 

she "fell about 3 weeks ago." from 10/21/2024


History of Any Multi-Drug Resistant Organisms: None Reported


Past Surgical History: No Surgical Hx Reported


Past Anesthesia/Blood Transfusion Reactions: No Reported Reaction


Past Psychological History: No Psychological Hx Reported


Smoking Status: Current every day smoker


Past Alcohol Use History: Daily, Heavy


Additional Past Alcohol Use History / Comment(s): Pt states she drinks at least 

12 beers daily x several years. Pt states "I start drinking in the morning and 

drink all day."


Past Drug Use History: Marijuana


Additional Drug Use History / Comment(s): UDS not obtained 10/21/2024





- Past Family History


  ** Mother


Family Medical History: Hypertension





Medications and Allergies


                                Home Medications











 Medication  Instructions  Recorded  Confirmed  Type


 


No Known Home Medications  10/21/24 10/21/24 History








                                    Allergies











Allergy/AdvReac Type Severity Reaction Status Date / Time


 


No Known Allergies Allergy   Verified 10/21/24 22:18














Physical Exam


Vitals: 


                                   Vital Signs











  Temp Pulse Resp BP Pulse Ox


 


 10/23/24 06:58  97.9 F  98  16  120/83  96














Results


CBC & Chem 7: 


                                 10/22/24 08:38





                                 10/23/24 12:26


Labs: 


                  Abnormal Lab Results - Last 24 Hours (Table)











  10/23/24 10/23/24 Range/Units





  12:26 15:17 


 


Sodium  128 L   (137-145)  mmol/L


 


BUN  3 L   (7-17)  mg/dL


 


Creatinine  0.37 L   (0.52-1.04)  mg/dL


 


Glucose  71 L   (74-99)  mg/dL


 


AST  65 H   (14-36)  U/L


 


Alkaline Phosphatase  177 H   ()  U/L


 


Albumin  3.0 L   (3.5-5.0)  g/dL


 


U Benzodiazepines Scrn   Detected H  (NotDetected)

## 2024-10-24 NOTE — P.PN
Progress Note - Text


Progress Note Date: 10/24/24





Interval History:


Patient was seen wandering the hallways and was directable and agreeable to sp

desiree with writer in the office.  She states feeling well today and that she was 

finally able to get some sleep last night.  Staff noted patient to be sleeping 

for 5 hours last night.  Sleep hygiene was discussed with the patient and she 

was recommended to incorporate this into her regimen.  Patient's prior alcohol 

use was discussed thoroughly with the patient including the guidelines for the 

standard recommended amount of alcohol intake per day for women.  Patient was 

very open about her desire to continue drinking upon discharge however alcohol's

long-term effects was discussed including patient's elevated liver enzymes and 

she was strongly encouraged to decrease its use however appears to be 

precontemplative today.  Patient expresses poor appetite related to difficulty 

swallowing.  Medical consulted GI related to patient's swallowing difficulties. 

Patient was able to see her  yesterday during visitation and she signed a

deferral for treatment with a  yesterday.  At this time patient denies any

suicidal or homicidal ideations, intent or plan. Patient denies any auditory, 

visual hallucinations and denies any paranoia or delusions. Patient denies any 

side effects from the medications and has been compliant with meds. 





Mental Status Exam:


General Appearance: Patient appears to be order than stated age is alert, 

directable, and cooperative.  Patient is very thin


Behavior: Patient is calmly seated without any agitated behavior.  Patient 

ambulates via walker


Speech: Patient's speech is fluent and nonpressured. 


Mood/Affect: Mood is improving mildly, affect is congruent and blunted but 

reactive. 


Suicidality/Homicidality:  Patient denies having any suicidal or homicidal 

ideation intent or plan.  


Perceptions: Patient denies any visual hallucinations and denies any auditory 

hallucinations


Though content/process: There is no evidence of any delusional thought content 

and thought process is linear and goal-directed.


Memory and concentration: AOX3, grossly intact for the purposes of this session


Judgment and insight: Improving mildly





Assessment


Major depressive disorder


Alcohol use disorder, severe


Generalized anxiety disorder with panic attacks


Nicotine dependence





Plan:


-Patient continues to meet criteria for inpatient psychiatric admission for 

symptom stabilization and safety. Patient has not signed adult voluntary form 

and medication consent and was placed in patient's chart.


-Medications: Continue Remeron 7.5 mg at bedtime for mood/sleep/appetite, 

melatonin 10 mg at bedtime for sleep, trazodone 50 mg at bedtime for sleep


-When necessary Ativan and Haldol for agitation/aggression.


-Labs: fT4 returned back within normal limits, repeat BMP ordered as patient 

sodium was low


-NRT -nicotine patch


-CIWA protocol with Ativan PRN for ETOH withdrawal.


-Orthopedics is recommending patient to follow-up in 1 week outpatient


-SW on board for discharge planning.  Encouraged the patient to participate in 

milieu.  Patient signed a deferral yesterday with a .  Anticipate 

discharge home with  tomorrow, social work confirmed no firearms in the 

home

## 2024-10-25 VITALS — DIASTOLIC BLOOD PRESSURE: 62 MMHG | SYSTOLIC BLOOD PRESSURE: 94 MMHG | HEART RATE: 135 BPM

## 2024-10-25 RX ADMIN — PANTOPRAZOLE SODIUM SCH MG: 40 TABLET, DELAYED RELEASE ORAL at 08:39

## 2024-10-25 NOTE — P.DS
Providers


Date of admission: 


10/21/24 21:18





Expected date of discharge: 10/25/24


Attending physician: 


Wendy Bansal MD





Consults: 





                                        





10/21/24 21:32


Consult Physician Routine 


   Consulting Provider: [facility]


   Consult Reason/Comments: H&P and medical


   Do you want consulting provider notified?: Yes





10/21/24 23:42


Consult Physician Routine 


   Consulting Provider: Jose Melton


   Consult Reason/Comments: Fractured wrist


   Do you want consulting provider notified?: Yes, Notify in am





10/24/24 01:30


Consult Physician Urgent 


   Consulting Provider: Linda Mooney


   Consult Reason/Comments: Dysphagia, weight loss


   Do you want consulting provider notified?: Yes











Primary care physician: 


Stated None








- Discharge Diagnosis(es)


(1) Major depressive disorder


Current Visit: Yes   Status: Acute   Priority: High   





(2) Alcohol use disorder, severe, dependence


Current Visit: Yes   Status: Acute   Priority: High   





(3) Generalized anxiety disorder with panic attacks


Current Visit: Yes   Status: Chronic   Priority: Low   





(4) Nicotine dependence


Current Visit: Yes   Status: Acute   Priority: Low   


Hospital Course: 





Admission HPI:


Admission note was completed by writer "Patient presented to the hospital on 

10/21 with a right wrist injury.  Per ED note, "states that she has been having 

pain to her right wrist over the past few weeks after she fell and has had 

residual pain since.  She has full range of motion of the right wrist.  States 

that she has been having difficulty with swallowing over the past 5 to 6 months.

 States that she feels food gets stuck in her throat will have episodes of 

emesis.  She has had an amount of weight loss over that time period as well due 

to not eating.  Patient has a history of alcohol abuse and currently drinks 

approximately 12 cans of beer per day she has lost in follow-up with primary and

specialist over the past 2 years."  EPS assessment revealed, "patient wanted to 

leave AMA.  Patient disclosed to triage that they have suicidal thoughts.  Upon 

further exploration of the statements, patient reports passive suicidal 

ideation.  When asked about a specific plan, patient states there are several 

ways to do it but she has only thought about carbon monoxide.  Patient reports 

physical health issues over the last 4 to 6 months, chronic pain, not sleeping 

but 20 to 30 minutes a day sometimes, trouble swallowing, significant weight 

loss of 26 pounds during the 4 to 6-month period."  Patient seen and evaluated 

on the unit and was agreeable to speak to writer in office.  She states she has 

been having issues with pain, specifically her hip and wrist and states that she

fell roughly 3 weeks ago.  She reports difficulty swallowing which resulted in a

significant amount of weight loss.  She states being unable to sleep because of 

her severe pain however does not report feeling fatigued throughout the day.  

She states she has been drinking roughly 12-12 ounce beers per day however she 

does not feel like she has a problem drinking and has never gone to rehab and is

not interested at the moment.  She feels like this is her only way of consuming 

calories as she is unable to swallow food.  She reports low energy as well as 2 

incidents of suicidal ideations that appear to be more passive in nature as she 

denied any plan or intent.  At this time, patient denies any suicidal or 

homicidal ideations intent or plan.  At this time patient denies any auditory or

visual hallucinations.  Patient denies any flight of ideas racing thoughts and 

increased in goal directed behavior.  Patient admits to using tobacco."





Hospital course:


Upon admission to the unit patient was admitted involuntarily on a petition and 

certificate and a second certificate was completed and faxed to the courts.  

Patient ended up signing a deferral with the  and agreeing to treatme

nt.. Patient got along well with other patients on the unit and followed unit 

protocol.  Patient was compliant with the medications and denied any side 

effects throughout hospital course.  Patient was started on Remeron 7.5 mg at 

bedtime, melatonin which was increased to 10 mg at bedtime, trazodone 50 mg at 

bedtime.  Patient spoke of her stressors and engaged in therapy both group and 

individual.  Patient was also seen by medical team for history and physical 

exam.  Patient was also seen by orthopedics given her wrist fracture and they 

recommended an outpatient follow-up in 1 week.  Patient reported dysphagia with 

significant weight loss so GI was consulted as well and they recommended 

endoscopy outpatient started patient on Protonix.  Patient to also follow-up 

with primary care outpatient given her chronic pain which is contributing to 

mood symptoms.  Throughout the course of the hospitalization patient gradually 

improved with regards to mood, anxiety, sleep and returned back to their 

baseline level of functioning became more future oriented with improved insight 

and judgment. On the day of discharge patient denied any suicidal or homicidal 

ideations intent or plan denied any auditory or visual hallucinations.  The 

patient denied any access to guns or weapons.  Patient denied any paranoia and 

did not endorse any delusions.  Patient does have a significant history of 

substance abuse and was counseled on abstaining from all substances including 

alcohol.  Patient was offered however declined inpatient substance-abuse rehab. 

Patient was strongly encouraged to limit her alcohol intake to 1 standard drink 

per day per guidelines. Patient was also counseled on the medications and need 

for regular compliance and was encouraged to follow-up with their outpatient 

appointment for mental health and also for primary care.  Prior to discharge a 

family meeting will be arranged by  to answer any questions and 

ensure safety upon discharge incuding making sure that guns/weapons are either 

removed from the home or locked away.





Mental status exam:


General Appearance: Patient appears to be older than stated age is alert, 

pleasant, and cooperative. Patient is in no acute distress and has improved 

hygiene and grooming 


Behavior: Patient is calmly seated without any agitated behavior.  Ambulates via

walker


Speech: Patient's speech is fluent and nonpressured. 


Mood/Affect: Patient reports their mood is "better", affect is congruent and 

euthymic, reactive. 


Suicidality/Homicidality:  Patient denies having any suicidal or homicidal 

ideation intent or plan.  


Perceptions: Patient denies any auditory or visual hallucinations.  


Though content/process: There is no evidence of any delusional thought content 

and thought process is linear and goal-directed.  More future oriented


Memory and concentration: AOX3, grossly intact for the purposes of this session.

Can spell "WORLD" backwards correctly.


Judgment and insight: Fair





Impression:


Major depressive disorder


Alcohol use disorder, severe


Generalized anxiety disorder with panic attacks


Nicotine dependence





Plan:


-Continue with discharge today as patient has improved and stabilized 

psychiatrically and is not currently an imminent threat to themself and/or 

others.


-Continue medications: Continue Remeron 7.5 mg at bedtime for 

mood/sleep/appetite, melatonin 10 mg at bedtime for sleep, trazodone 50 mg at 

bedtime for sleep


-Patient was counseled on the need for medication compliance and appropriate 

follow-up at mental health and also primary care for medical issues.  Patient 

verbalized understanding and agreed.


-Social work to help coordinate patients discharge today arrange for and conduct

family meeting to ensure safety upon discharge and answer any 

questions/concerns. also to ensure safe home environment that guns/weapons are 

either removed from the home or locked away. Social work also to arrange for 

patients follow up appointments with [facility] for psychiatric care along with follow 

up with primary care provider.


-Patient counseled on abstaining from recreational drugs and marijuana and 

alcohol. Was informed/educated on the adverse effects on their physical and 

mental health. Patient verbally agreed and understood.  Patient was offered 

substance abuse treatment however declined at this time.


-Patient was instructed to return to the hospital or seek immediate medical care

if their psychiatric or medical symptoms do worsen or reoccur.








                                  Abnormal Labs











  10/22/24 10/22/24 10/23/24





  08:38 08:38 12:26


 


WBC  11.7 H  


 


MCV  105.0 H  


 


Sodium   131 L  128 L


 


BUN   2 L  3 L


 


Creatinine   0.38 L  0.37 L


 


Glucose   130 H  71 L


 


AST   87 H  65 H


 


ALT   40 H 


 


Alkaline Phosphatase   198 H  177 H


 


Albumin   3.3 L  3.0 L


 


TSH   13.400 H fT4 wnl


 


U Benzodiazepines Scrn   














  10/23/24 10/24/24





  15:17 12:48


 


WBC  


 


MCV  


 


Sodium   130 L


 


BUN   4 L


 


Creatinine   0.42 L


 


Glucose  


 


AST  


 


ALT  


 


Alkaline Phosphatase  


 


Albumin  


 


TSH  


 


U Benzodiazepines Scrn  Detected H 











                                   Vital Signs











Temp  97.9 F   10/25/24 06:00


 


Pulse  135 H  10/25/24 08:41


 


Resp  16   10/25/24 06:00


 


BP  94/62   10/25/24 08:41


 


Pulse Ox  98   10/25/24 06:00


 


FiO2      








                                 Intake & Output











 10/24/24 10/25/24 10/25/24





 18:59 06:59 18:59


 


Intake Total 550  


 


Balance 550  


 


Intake:   


 


  Oral 550  











                                    Allergies











Allergy/AdvReac Type Severity Reaction Status Date / Time


 


No Known Allergies Allergy   Verified 10/21/24 22:18











Patient Condition at Discharge: Stable





Plan - Discharge Summary


Discharge Rx Participant: Yes


New Discharge Prescriptions: 


New


   Folic Acid 1 mg PO DAILY 30 Days #30 tab


   Melatonin 10 mg PO HS 30 Days #60 tab


   Multivitamins, Thera [Multivitamin (formulary)] 1 each PO DAILY 30 Days #30 

tab


   Thiamine [Vitamin B-1] 100 mg PO DAILY 30 Days #30 tab


   traZODone HCL [Desyrel] 50 mg PO HS 30 Days #30 tab


   Nicotine 14Mg/24Hr Patch [Habitrol] 1 patch TRANSDERM DAILY  patch


   Pantoprazole [Protonix] 40 mg PO AC-BRKFST 30 Days #30 tab


   Mirtazapine [Remeron] 7.5 mg PO HS 30 Days #15 tab


Discharge Medication List





Folic Acid 1 mg PO DAILY 30 Days #30 tab 10/25/24 [Rx]


Melatonin 10 mg PO HS 30 Days #60 tab 10/25/24 [Rx]


Mirtazapine [Remeron] 7.5 mg PO HS 30 Days #15 tab 10/25/24 [Rx]


Multivitamins, Thera [Multivitamin (formulary)] 1 each PO DAILY 30 Days #30 tab 

10/25/24 [Rx]


Nicotine 14Mg/24Hr Patch [Habitrol] 1 patch TRANSDERM DAILY  patch 10/25/24 [Rx]


Pantoprazole [Protonix] 40 mg PO AC-BRKFST 30 Days #30 tab 10/25/24 [Rx]


Thiamine [Vitamin B-1] 100 mg PO DAILY 30 Days #30 tab 10/25/24 [Rx]


traZODone HCL [Desyrel] 50 mg PO HS 30 Days #30 tab 10/25/24 [Rx]








Follow up Appointment(s)/Referral(s): 


Linda Mooney MD [STAFF PHYSICIAN] - 10/30/24 (Dr. Mooney's office 

(Gastroenterologist) will call to schedule an outpatient appointment next week 

for follow-up per GLADYS Benavidez)


[facility] [REFERRING] - 1 Week


[facility] [NON-STAFF] - 10/28/24 9:30 am


([place] Office: [address] [place] [address]


With [place])


Jose Melton MD [STAFF PHYSICIAN] - 10/31/24 9:40 am


Patient Instructions/Handouts:  How to Stop Smoking (DC), Wrist Fracture in 

Adults (GEN), Depression (DC), Abuse of Alcohol (DC), Anxiety (GEN), Dysphagia 

(GEN)


Activity/Diet/Wound Care/Special Instructions: 


Avoid the use of street drugs and alcohol.  Take all medications as prescribed. 

When you are in need of refills on your medications, please contact your medical

provider and/or outpatient psychiatrist/provider to have this done.  Please go 

to your scheduled outpatient appointment for aftercare treatment.  If symptoms 

return or become worse, call the crisis line at [phone] and/or go to the 

nearest emergency room for evaluation.  National Suicide Hotline 988


Discharge Disposition: HOME SELF-CARE